# Patient Record
Sex: FEMALE | Race: BLACK OR AFRICAN AMERICAN | NOT HISPANIC OR LATINO | Employment: UNEMPLOYED | URBAN - METROPOLITAN AREA
[De-identification: names, ages, dates, MRNs, and addresses within clinical notes are randomized per-mention and may not be internally consistent; named-entity substitution may affect disease eponyms.]

---

## 2021-07-30 ENCOUNTER — SOCIAL WORK (OUTPATIENT)
Dept: ADMINISTRATIVE | Facility: OTHER | Age: 26
End: 2021-07-30

## 2021-08-02 PROBLEM — Z3A.12 12 WEEKS GESTATION OF PREGNANCY: Status: ACTIVE | Noted: 2021-08-02

## 2021-08-05 ENCOUNTER — PATIENT MESSAGE (OUTPATIENT)
Dept: ADMINISTRATIVE | Facility: OTHER | Age: 26
End: 2021-08-05

## 2021-08-09 PROBLEM — F12.91 HISTORY OF MARIJUANA USE: Status: ACTIVE | Noted: 2021-08-09

## 2021-08-09 PROBLEM — Z87.891 FORMER TOBACCO USE: Status: ACTIVE | Noted: 2021-08-09

## 2021-08-09 PROBLEM — O09.91 HIGH-RISK PREGNANCY IN FIRST TRIMESTER: Status: ACTIVE | Noted: 2021-08-09

## 2021-08-09 PROBLEM — Z87.828 HISTORY OF MOTOR VEHICLE ACCIDENT: Status: ACTIVE | Noted: 2021-08-09

## 2021-08-09 PROBLEM — Z30.09 COUNSELING FOR BIRTH CONTROL REGARDING INTRAUTERINE DEVICE (IUD): Status: ACTIVE | Noted: 2021-08-09

## 2021-08-30 PROBLEM — Z3A.16 16 WEEKS GESTATION OF PREGNANCY: Status: ACTIVE | Noted: 2021-08-02

## 2021-08-30 PROBLEM — O09.91 HIGH-RISK PREGNANCY IN FIRST TRIMESTER: Status: RESOLVED | Noted: 2021-08-09 | Resolved: 2021-08-30

## 2021-08-30 PROBLEM — O09.92 HIGH-RISK PREGNANCY IN SECOND TRIMESTER: Status: ACTIVE | Noted: 2021-08-09

## 2021-09-27 PROBLEM — Z3A.20 20 WEEKS GESTATION OF PREGNANCY: Status: ACTIVE | Noted: 2021-08-02

## 2021-10-28 ENCOUNTER — PATIENT MESSAGE (OUTPATIENT)
Dept: ADMINISTRATIVE | Facility: OTHER | Age: 26
End: 2021-10-28

## 2021-12-06 PROBLEM — Z3A.30 30 WEEKS GESTATION OF PREGNANCY: Status: ACTIVE | Noted: 2021-08-02

## 2021-12-06 PROBLEM — O09.93 HIGH-RISK PREGNANCY IN THIRD TRIMESTER: Status: ACTIVE | Noted: 2021-08-09

## 2021-12-10 PROBLEM — O99.013 ANEMIA DURING PREGNANCY IN THIRD TRIMESTER: Status: ACTIVE | Noted: 2021-12-10

## 2021-12-27 PROBLEM — Z3A.32 32 WEEKS GESTATION OF PREGNANCY: Status: ACTIVE | Noted: 2021-08-02

## 2021-12-31 PROBLEM — O98.513 COVID-19 AFFECTING PREGNANCY IN THIRD TRIMESTER: Status: ACTIVE | Noted: 2021-12-31

## 2021-12-31 PROBLEM — U07.1 COVID-19 AFFECTING PREGNANCY IN THIRD TRIMESTER: Status: ACTIVE | Noted: 2021-12-31

## 2022-01-18 PROBLEM — Z98.891 STATUS POST EMERGENCY CESAREAN SECTION: Status: ACTIVE | Noted: 2022-01-18

## 2023-05-17 ENCOUNTER — SOCIAL WORK (OUTPATIENT)
Dept: ADMINISTRATIVE | Facility: OTHER | Age: 28
End: 2023-05-17

## 2023-05-17 NOTE — PROGRESS NOTES
SW met with pt regarding initial OB assessment. Pt stated this is her 4th pregnancy/0-miscarriage. Pt stated lives alone with her children-12,6,1 and able to perform ADL's independently. Pt stated does work. Pt stated support system is the [facility]/[place]. Pt stated has medicaid([employer]). Pt stated does not have WIC. Pt stated is going to breastfeed. SW provide pt with information on other community resources.SW faxed and scanned pt's notification of pregnancy into epic.  No other needs identified at this time.    Makenzie Gerardo,MSW  Pager#[phone]   Children-12,6,1

## 2023-05-19 PROBLEM — K59.00 CONSTIPATION: Status: ACTIVE | Noted: 2023-05-19

## 2023-05-24 PROBLEM — O20.9 VAGINAL BLEEDING AFFECTING EARLY PREGNANCY: Status: RESOLVED | Noted: 2023-05-24 | Resolved: 2023-05-24

## 2023-05-24 PROBLEM — O20.9 VAGINAL BLEEDING AFFECTING EARLY PREGNANCY: Status: ACTIVE | Noted: 2023-05-24

## 2023-07-10 ENCOUNTER — PATIENT MESSAGE (OUTPATIENT)
Dept: ADMINISTRATIVE | Facility: OTHER | Age: 28
End: 2023-07-10

## 2025-01-14 PROBLEM — Z98.891 STATUS POST EMERGENCY CESAREAN SECTION: Status: RESOLVED | Noted: 2022-01-18 | Resolved: 2025-01-14

## 2025-01-14 PROBLEM — Z30.09 COUNSELING FOR BIRTH CONTROL REGARDING INTRAUTERINE DEVICE (IUD): Status: RESOLVED | Noted: 2021-08-09 | Resolved: 2025-01-14

## 2025-01-14 PROBLEM — O98.513 COVID-19 AFFECTING PREGNANCY IN THIRD TRIMESTER: Status: RESOLVED | Noted: 2021-12-31 | Resolved: 2025-01-14

## 2025-01-14 PROBLEM — U07.1 COVID-19 AFFECTING PREGNANCY IN THIRD TRIMESTER: Status: RESOLVED | Noted: 2021-12-31 | Resolved: 2025-01-14

## 2025-01-14 PROBLEM — O09.90 SUPERVISION OF HIGH-RISK PREGNANCY: Status: ACTIVE | Noted: 2025-01-14

## 2025-01-14 PROBLEM — O99.013 ANEMIA DURING PREGNANCY IN THIRD TRIMESTER: Status: RESOLVED | Noted: 2021-12-10 | Resolved: 2025-01-14

## 2025-01-14 PROBLEM — O09.93 HIGH-RISK PREGNANCY IN THIRD TRIMESTER: Status: RESOLVED | Noted: 2021-08-09 | Resolved: 2025-01-14

## 2025-01-14 PROBLEM — K59.00 CONSTIPATION: Status: RESOLVED | Noted: 2023-05-19 | Resolved: 2025-01-14

## 2025-02-13 PROBLEM — R52 BODY ACHES: Status: ACTIVE | Noted: 2025-02-13

## 2025-03-10 PROBLEM — M54.50 ACUTE RIGHT-SIDED LOW BACK PAIN: Status: ACTIVE | Noted: 2025-03-10

## 2025-03-11 PROBLEM — M54.9 BACK PAIN AFFECTING PREGNANCY IN SECOND TRIMESTER: Status: ACTIVE | Noted: 2025-03-11

## 2025-03-11 PROBLEM — O99.891 BACK PAIN AFFECTING PREGNANCY IN SECOND TRIMESTER: Status: ACTIVE | Noted: 2025-03-11

## 2025-03-18 ENCOUNTER — PATIENT MESSAGE (OUTPATIENT)
Dept: ADMINISTRATIVE | Facility: OTHER | Age: 30
End: 2025-03-18